# Patient Record
Sex: MALE | Race: WHITE | NOT HISPANIC OR LATINO | Employment: UNEMPLOYED | URBAN - METROPOLITAN AREA
[De-identification: names, ages, dates, MRNs, and addresses within clinical notes are randomized per-mention and may not be internally consistent; named-entity substitution may affect disease eponyms.]

---

## 2017-04-28 ENCOUNTER — HOSPITAL ENCOUNTER (EMERGENCY)
Facility: HOSPITAL | Age: 52
Discharge: HOME | End: 2017-04-28
Attending: EMERGENCY MEDICINE

## 2017-04-28 VITALS
RESPIRATION RATE: 20 BRPM | DIASTOLIC BLOOD PRESSURE: 88 MMHG | HEIGHT: 70 IN | TEMPERATURE: 98 F | HEART RATE: 92 BPM | BODY MASS INDEX: 26.48 KG/M2 | SYSTOLIC BLOOD PRESSURE: 146 MMHG | OXYGEN SATURATION: 96 % | WEIGHT: 185 LBS

## 2017-04-28 DIAGNOSIS — S93.402A MODERATE LEFT ANKLE SPRAIN, INITIAL ENCOUNTER: Primary | ICD-10-CM

## 2017-04-28 DIAGNOSIS — M25.572 LEFT ANKLE PAIN: ICD-10-CM

## 2017-04-28 DIAGNOSIS — R06.6 INTRACTABLE HICCUPS: ICD-10-CM

## 2017-04-28 DIAGNOSIS — R10.13 ABDOMINAL PAIN, ACUTE, EPIGASTRIC: ICD-10-CM

## 2017-04-28 LAB
ALBUMIN SERPL BCP-MCNC: 3.6 G/DL
ALP SERPL-CCNC: 96 U/L
ALT SERPL W/O P-5'-P-CCNC: 22 U/L
AMYLASE SERPL-CCNC: 54 U/L
ANION GAP SERPL CALC-SCNC: 11 MMOL/L
AST SERPL-CCNC: 57 U/L
BASOPHILS # BLD AUTO: 0.01 K/UL
BASOPHILS NFR BLD: 0.2 %
BILIRUB SERPL-MCNC: 1 MG/DL
BUN SERPL-MCNC: 9 MG/DL
CALCIUM SERPL-MCNC: 8.6 MG/DL
CHLORIDE SERPL-SCNC: 99 MMOL/L
CO2 SERPL-SCNC: 27 MMOL/L
CREAT SERPL-MCNC: 0.8 MG/DL
DIFFERENTIAL METHOD: ABNORMAL
EOSINOPHIL # BLD AUTO: 0 K/UL
EOSINOPHIL NFR BLD: 0.3 %
ERYTHROCYTE [DISTWIDTH] IN BLOOD BY AUTOMATED COUNT: 14.9 %
EST. GFR  (AFRICAN AMERICAN): >60 ML/MIN/1.73 M^2
EST. GFR  (NON AFRICAN AMERICAN): >60 ML/MIN/1.73 M^2
GLUCOSE SERPL-MCNC: 81 MG/DL
HCT VFR BLD AUTO: 38.5 %
HGB BLD-MCNC: 13.5 G/DL
LIPASE SERPL-CCNC: 74 U/L
LYMPHOCYTES # BLD AUTO: 1.1 K/UL
LYMPHOCYTES NFR BLD: 16.6 %
MCH RBC QN AUTO: 30.8 PG
MCHC RBC AUTO-ENTMCNC: 35.1 %
MCV RBC AUTO: 88 FL
MONOCYTES # BLD AUTO: 0.7 K/UL
MONOCYTES NFR BLD: 10.3 %
NEUTROPHILS # BLD AUTO: 4.7 K/UL
NEUTROPHILS NFR BLD: 72.4 %
PLATELET # BLD AUTO: 212 K/UL
PMV BLD AUTO: 9.5 FL
POTASSIUM SERPL-SCNC: 3.5 MMOL/L
PROT SERPL-MCNC: 7 G/DL
RBC # BLD AUTO: 4.38 M/UL
SODIUM SERPL-SCNC: 137 MMOL/L
TROPONIN I SERPL DL<=0.01 NG/ML-MCNC: 0.01 NG/ML
WBC # BLD AUTO: 6.51 K/UL

## 2017-04-28 PROCEDURE — 85025 COMPLETE CBC W/AUTO DIFF WBC: CPT

## 2017-04-28 PROCEDURE — 82150 ASSAY OF AMYLASE: CPT

## 2017-04-28 PROCEDURE — 83690 ASSAY OF LIPASE: CPT

## 2017-04-28 PROCEDURE — 99285 EMERGENCY DEPT VISIT HI MDM: CPT | Mod: 25

## 2017-04-28 PROCEDURE — C9113 INJ PANTOPRAZOLE SODIUM, VIA: HCPCS | Performed by: EMERGENCY MEDICINE

## 2017-04-28 PROCEDURE — 25000003 PHARM REV CODE 250: Performed by: EMERGENCY MEDICINE

## 2017-04-28 PROCEDURE — 80053 COMPREHEN METABOLIC PANEL: CPT

## 2017-04-28 PROCEDURE — 96374 THER/PROPH/DIAG INJ IV PUSH: CPT

## 2017-04-28 PROCEDURE — 93005 ELECTROCARDIOGRAM TRACING: CPT

## 2017-04-28 PROCEDURE — 84484 ASSAY OF TROPONIN QUANT: CPT

## 2017-04-28 PROCEDURE — 63600175 PHARM REV CODE 636 W HCPCS: Performed by: EMERGENCY MEDICINE

## 2017-04-28 PROCEDURE — 96375 TX/PRO/DX INJ NEW DRUG ADDON: CPT

## 2017-04-28 RX ORDER — CHLORPROMAZINE HYDROCHLORIDE 25 MG/ML
25 INJECTION INTRAMUSCULAR
Status: COMPLETED | OUTPATIENT
Start: 2017-04-28 | End: 2017-04-28

## 2017-04-28 RX ORDER — CHLORPROMAZINE HYDROCHLORIDE 25 MG/1
TABLET, FILM COATED ORAL
Qty: 20 TABLET | Refills: 0 | Status: SHIPPED | OUTPATIENT
Start: 2017-04-28 | End: 2020-10-14 | Stop reason: CLARIF

## 2017-04-28 RX ORDER — PANTOPRAZOLE SODIUM 40 MG/1
TABLET, DELAYED RELEASE ORAL
Qty: 30 TABLET | Refills: 0 | Status: SHIPPED | OUTPATIENT
Start: 2017-04-28 | End: 2020-10-14 | Stop reason: CLARIF

## 2017-04-28 RX ORDER — PANTOPRAZOLE SODIUM 40 MG/10ML
80 INJECTION, POWDER, LYOPHILIZED, FOR SOLUTION INTRAVENOUS
Status: COMPLETED | OUTPATIENT
Start: 2017-04-28 | End: 2017-04-28

## 2017-04-28 RX ORDER — DIPHENHYDRAMINE HYDROCHLORIDE 50 MG/ML
50 INJECTION INTRAMUSCULAR; INTRAVENOUS
Status: COMPLETED | OUTPATIENT
Start: 2017-04-28 | End: 2017-04-28

## 2017-04-28 RX ORDER — MAG HYDROX/ALUMINUM HYD/SIMETH 200-200-20
60 SUSPENSION, ORAL (FINAL DOSE FORM) ORAL
Status: COMPLETED | OUTPATIENT
Start: 2017-04-28 | End: 2017-04-28

## 2017-04-28 RX ADMIN — FOLIC ACID: 5 INJECTION, SOLUTION INTRAMUSCULAR; INTRAVENOUS; SUBCUTANEOUS at 02:04

## 2017-04-28 RX ADMIN — CHLORPROMAZINE HYDROCHLORIDE 25 MG: 25 INJECTION INTRAMUSCULAR at 01:04

## 2017-04-28 RX ADMIN — PANTOPRAZOLE SODIUM 80 MG: 40 INJECTION, POWDER, FOR SOLUTION INTRAVENOUS at 01:04

## 2017-04-28 RX ADMIN — ALUMINUM HYDROXIDE, MAGNESIUM HYDROXIDE, AND SIMETHICONE 60 ML: 200; 200; 20 SUSPENSION ORAL at 01:04

## 2017-04-28 RX ADMIN — DIPHENHYDRAMINE HYDROCHLORIDE 50 MG: 50 INJECTION, SOLUTION INTRAMUSCULAR; INTRAVENOUS at 01:04

## 2017-04-28 NOTE — DISCHARGE INSTRUCTIONS
Please use Mylanta 30 cc by mouth every 4 hours as needed for epigastric abdominal pain.  Please stop drinking alcohol.  Return immediately if you get worse or if new problems develop.  Rest.  Please avoid caffeine carbonation alcohol cigarette citrus tomato spicy and greasy foods.

## 2017-04-28 NOTE — ED TRIAGE NOTES
Patient came in PER [facility] with c/o abdominal pain x 2 days. Patient has been drinking everyday for the last few days. Patient states that he has a history of ulcers patient also c/o LEFT sided ankle pain after a trip and fall. Swelling noted.

## 2017-04-28 NOTE — ED AVS SNAPSHOT
[facility]  [address] [place] [address]               Braden Diop   2017  1:00 PM   ED    Description:  Male : 1965   Department:  [facility]           Your Care was Coordinated By:     Provider Role From To    Oswaldo Morales MD Attending Provider 17 1302 --      Reason for Visit     Abdominal Pain     Ankle Pain           Diagnoses this Visit        Comments    Moderate left ankle sprain, initial encounter    -  Primary     Left ankle pain         Intractable hiccups         Abdominal pain, acute, epigastric           ED Disposition     None           To Do List           Follow-up Information     Follow up with Harlan Espinoza MD In 3 days.    Specialty:  Family Medicine    Contact information:    [address] [place] [address]  [phone]         These Medications        Disp Refills Start End    pantoprazole (PROTONIX) 40 MG tablet 30 tablet 0 2017     Please take one tablet by mouth every 12 hours as needed for epigastric abdominal pain, then take one tablet every day.    chlorproMAZINE (THORAZINE) 25 MG tablet 20 tablet 0 2017     Please take one tablet by mouth every 8 hours as needed for hiccups.  Please use Benadryl, 50 mg tablets, one by mouth when you use the Thorazine.      [facility]     [facility] Care [facility] -  Assistance  Unless otherwise directed by your provider, please contact [facility], our nurse care line that is available for  assistance.     Registered nurses in the [facility] provide: appointment scheduling, clinical advisement, health education, and other advisory services.  Call: [phone] (toll free)               Medications           START taking these NEW medications        Refills    pantoprazole (PROTONIX) 40 MG tablet 0    Sig: Please take one tablet by mouth every 12 hours as needed for epigastric abdominal pain, then take one  tablet every day.    Class: Print    chlorproMAZINE (THORAZINE) 25 MG tablet 0    Sig: Please take one tablet by mouth every 8 hours as needed for hiccups.  Please use Benadryl, 50 mg tablets, one by mouth when you use the Thorazine.    Class: Print      These medications were administered today        Dose Freq    aluminum-magnesium hydroxide-simethicone 200-200-20 mg/5 mL suspension 60 mL 60 mL ED 1 Time    Sig: Take 60 mLs by mouth ED 1 Time.    Class: Normal    Route: Oral    pantoprazole injection 80 mg 80 mg ED 1 Time    Sig: Inject 80 mg into the vein ED 1 Time.    Class: Normal    Route: Intravenous    chlorproMAZINE injection 25 mg 25 mg ED 1 Time    Sig: Inject 1 mL (25 mg total) into the vein ED 1 Time.    Class: Normal    Route: Intravenous    diphenhydrAMINE injection 50 mg 50 mg ED 1 Time    Sig: Inject 1 mL (50 mg total) into the vein ED 1 Time.    Class: Normal    Route: Intravenous    sodium chloride 0.9% 1,000 mL with mvi, adult no.4 with vit K 3,300 unit- 150 mcg/10 mL 10 mL, thiamine 100 mg, folic acid 1 mg infusion  Once    Sig: Inject into the vein once.    Class: Normal    Route: Intravenous           Verify that the below list of medications is an accurate representation of the medications you are currently taking.  If none reported, the list may be blank. If incorrect, please contact your healthcare provider. Carry this list with you in case of emergency.           Current Medications     chlorproMAZINE (THORAZINE) 25 MG tablet Please take one tablet by mouth every 8 hours as needed for hiccups.  Please use Benadryl, 50 mg tablets, one by mouth when you use the Thorazine.    chlorproMAZINE injection 25 mg Inject 1 mL (25 mg total) into the vein ED 1 Time.    pantoprazole (PROTONIX) 40 MG tablet Please take one tablet by mouth every 12 hours as needed for epigastric abdominal pain, then take one tablet every day.    pantoprazole injection 80 mg Inject 80 mg into the vein ED 1 Time.          "  Clinical Reference Information           Your Vitals Were     BP Pulse Temp Resp Height Weight    138/94 (BP Location: Left arm, Patient Position: Sitting) 90 98.6 °F (37 °C) (Oral) 20 5' 10" (1.778 m) 83.9 kg (185 lb)    SpO2 BMI             100% 26.54 kg/m2         Allergies as of 4/28/2017     No Known Allergies      Immunizations Administered on Date of Encounter - 4/28/2017     None      ED Micro, Lab, POCT     Start Ordered       Status Ordering Provider    04/28/17 1313 04/28/17 1313  Troponin I  STAT      Final result     04/28/17 1312 04/28/17 1313  Amylase  STAT      Final result     04/28/17 1312 04/28/17 1313  Lipase  STAT      Final result     04/28/17 1312 04/28/17 1313  CBC auto differential  STAT      Final result     04/28/17 1312 04/28/17 1313  Comprehensive metabolic panel  STAT      Final result       ED Imaging Orders     Start Ordered       Status Ordering Provider    04/28/17 1312 04/28/17 1313  X-Ray Chest AP Portable  1 time imaging      Final result     04/28/17 1312 04/28/17 1313  X-Ray Ankle Complete Left  1 time imaging      Final result         Discharge Instructions       Please use Mylanta 30 cc by mouth every 4 hours as needed for epigastric abdominal pain.  Please stop drinking alcohol.  Return immediately if you get worse or if new problems develop.  Rest.  Please avoid caffeine carbonation alcohol cigarette citrus tomato spicy and greasy foods.    [facility] Sign-Up     Activating your [facility] account is as easy as 1-2-3!     1) Visit [email or web address], select Sign Up Now, enter this activation code and your date of birth, then select Next.  [email or web address]  Expires: 6/12/2017  2:33 PM      2) Create a username and password to use when you visit [facility] in the future and select a security question in case you lose your password and select Next.    3) Enter your e-mail address and click Sign Up!    Additional Information  If you have questions, please e-mail " [email or web address] or call [phone] to talk to our [facility] staff. Remember, [employer] is NOT to be used for urgent needs. For medical emergencies, dial 911.         Smoking Cessation     If you would like to quit smoking:   You may be eligible for free services if you are a [place] resident and started smoking cigarettes before September 1, 1988.  Call the Smoking Cessation Trust ([facility]) toll free at [phone] or [phone] 839-8384.   Call 7-973-QUIT-NOW if you do not meet the above criteria.   Contact us via email: [email or web address]   View our website for more information: [email or web address]         [facility] complies with applicable Federal civil rights laws and does not discriminate on the basis of race, color, national origin, age, disability, or sex.        Language Assistance Services     ATTENTION: Language assistance services are available, free of charge. Please call [phone].      ATENCIÓN: Si habla español, tiene a simmons disposición servicios gratuitos de asistencia lingüística. Llame al [phone].     CHÚ Ý: N?u b?n nói Ti?ng Vi?t, có các d?ch v? h? tr? ngôn ng? mi?n phí dành cho b?n. G?i s? [phone].

## 2017-04-28 NOTE — ED PROVIDER NOTES
Encounter Date: 4/28/2017    SCRIBE #1 NOTE: I, Rl Lemuel, am scribing for, and in the presence of, Oswaldo Morales MD. Other sections scribed: HPI, ROS.       History     Chief Complaint   Patient presents with    Abdominal Pain     x 2 days. +ETOH. patient has a history of ulcers    Ankle Pain     LEFT ankle pain after a trip and fall; obvious swelling noted     Review of patient's allergies indicates:  No Known Allergies  HPI Comments: CC: Abdominal Pain, Ankle Pain  HPI: This 51 y.o. male smoker with Hx of R wrist surgery, R ankle surgery presents to the ED via EMS c/o severe burning epigastric abdominal pain that developed yesterday. Pt states that he has been hiccupping for 3 days, and this has started to make his abdomen feel sore. Pt admits that he has been drinking daily since [facility]. Pt also reports severe L ankle pain with associated swelling that developed yesterday after twisted his ankle stepping off a curb yesterday evening. Pt reports a mild cough for the past few days. He denies fever, cough, chest pain, SOB, vomiting, diarrhea.    The history is provided by the patient.     History reviewed. No pertinent past medical history.  Past Surgical History:   Procedure Laterality Date    ANKLE SURGERY      wrist surgery       History reviewed. No pertinent family history.  Social History   Substance Use Topics    Smoking status: Current Every Day Smoker     Packs/day: 1.00     Types: Cigarettes    Smokeless tobacco: None    Alcohol use Yes      Comment: unknown but is everyday     Review of Systems   Constitutional: Negative for chills and fever.   HENT: Negative for congestion and rhinorrhea.    Eyes: Negative for pain and visual disturbance.   Respiratory: Negative for cough and shortness of breath.    Cardiovascular: Negative for chest pain.   Gastrointestinal: Positive for abdominal pain (epigastric). Negative for diarrhea and vomiting.        (+) hiccups    Genitourinary: Negative for dysuria and flank pain.   Musculoskeletal: Positive for arthralgias (L ankle) and joint swelling (L ankle). Negative for myalgias.   Skin: Negative for rash and wound.   Neurological: Negative for syncope and headaches.       Physical Exam   Initial Vitals   BP Pulse Resp Temp SpO2   04/28/17 1257 04/28/17 1257 04/28/17 1257 04/28/17 1257 04/28/17 1257   138/94 90 20 98.6 °F (37 °C) 100 %     Physical Exam  The patient was examined specifically for the following:   General:No significant distress, Good color, Warm and dry. Head and neck:Scalp atraumatic, Neck supple. Neurological:Appropriate conversation, Gross motor deficits. Eyes:Conjugate gaze, Clear corneas. ENT: No epistaxis. Cardiac: Regular rate and rhythm, Grossly normal heart tones. Pulmonary: Wheezing, Rales. Gastrointestinal: Abdominal tenderness, Abdominal distention. Musculoskeletal: Extremity deformity, Apparent pain with range of motion of the joints. Skin: Rash.   The findings on examination were normal except for the following: The patient has moderate epigastric abdominal tenderness.  He has constant hiccups during the physical examination.  There is swelling and tenderness to the left ankle.  Neurovascular and tendon function are intact distally.  The injury is closed.  The remainder the abdomen is nontender heart tones are normal.  The patient has regular rate and rhythm.  ED Course   Procedures  Labs Reviewed   LIPASE - Abnormal; Notable for the following:        Result Value    Lipase 74 (*)     All other components within normal limits   CBC W/ AUTO DIFFERENTIAL - Abnormal; Notable for the following:     RBC 4.38 (*)     Hemoglobin 13.5 (*)     Hematocrit 38.5 (*)     RDW 14.9 (*)     Lymph% 16.6 (*)     All other components within normal limits   COMPREHENSIVE METABOLIC PANEL - Abnormal; Notable for the following:     Calcium 8.6 (*)     AST 57 (*)     All other components within normal limits   AMYLASE    TROPONIN I            EKG Readings: (Independently Interpreted)   This patient is in a sinus tachycardia with a heart rate of 107.  The CO QRS and QT intervals are normal.  There is no evidence of acute myocardial infarction or malignant arrhythmia.  The patient has no significant ST segment or T-wave changes.  There is no evidence of acute myocardial infarction.       X-Rays:   Independently Interpreted Readings:   Other Readings:  Chest x-ray fails to reveal pneumothorax pneumonia pleural effusion  X-rays of the left ankle failed to reveal any evidence of fracture.  This is a bad ankle sprain.    Medical decision making: Laboratory work fails to reveal any evidence of ischemia.  The patient has a mild anemia.  The troponin is negative.  I doubt myocardial infarction or malignant arrhythmia.  The EKG fails to reveal evidence of ischemia.  The patient's hiccups resolved with treatment with Thorazine.  I will discharge the patient on pantoprazole Mylanta and Thorazine for hiccups.  I will use a walking boot and crutches and have the patient return if he gets worse or if new problems develop.  There is no evidence of fracture of the left ankle.  I'll have the patient follow-up with primary care.  Patient is resting comfortably at 2:30 PM.             Scribe Attestation:   Scribe #1: I performed the above scribed service and the documentation accurately describes the services I performed. I attest to the accuracy of the note.    Attending Attestation:           Physician Attestation for Scribe:  Physician Attestation Statement for Scribe #1: I, Oswaldo Morales MD, reviewed documentation, as scribed by Rl Hdez in my presence, and it is both accurate and complete.                 ED Course     Clinical Impression:   The primary encounter diagnosis was Moderate left ankle sprain, initial encounter. Diagnoses of Left ankle pain, Intractable hiccups, and Abdominal pain, acute, epigastric were also pertinent to this  visit.          Oswaldo Morales MD  04/28/17 1998